# Patient Record
Sex: MALE | Race: WHITE
[De-identification: names, ages, dates, MRNs, and addresses within clinical notes are randomized per-mention and may not be internally consistent; named-entity substitution may affect disease eponyms.]

---

## 2017-01-31 ENCOUNTER — PRIOR ORIGINAL RECORDS (OUTPATIENT)
Dept: OTHER | Age: 52
End: 2017-01-31

## 2017-03-28 ENCOUNTER — PRIOR ORIGINAL RECORDS (OUTPATIENT)
Dept: OTHER | Age: 52
End: 2017-03-28

## 2017-03-31 ENCOUNTER — PRIOR ORIGINAL RECORDS (OUTPATIENT)
Dept: OTHER | Age: 52
End: 2017-03-31

## 2017-04-10 ENCOUNTER — HOSPITAL ENCOUNTER (OUTPATIENT)
Dept: LAB | Facility: HOSPITAL | Age: 52
Discharge: HOME | End: 2017-04-10
Attending: INTERNAL MEDICINE
Payer: COMMERCIAL

## 2017-04-10 ENCOUNTER — HOSPITAL ENCOUNTER (OUTPATIENT)
Dept: CV DIAGNOSTICS | Facility: HOSPITAL | Age: 52
Discharge: HOME | End: 2017-04-10
Attending: INTERNAL MEDICINE
Payer: COMMERCIAL

## 2017-04-10 ENCOUNTER — PRIOR ORIGINAL RECORDS (OUTPATIENT)
Dept: OTHER | Age: 52
End: 2017-04-10

## 2017-04-10 DIAGNOSIS — R94.31 ABNORMAL ECG: ICD-10-CM

## 2017-04-10 DIAGNOSIS — R00.2 HEART PALPITATIONS: ICD-10-CM

## 2017-04-10 DIAGNOSIS — R07.2 CHEST PAIN, PRECORDIAL: ICD-10-CM

## 2017-04-10 PROCEDURE — 93350 STRESS TTE ONLY: CPT | Performed by: INTERNAL MEDICINE

## 2017-04-10 PROCEDURE — 80061 LIPID PANEL: CPT | Performed by: INTERNAL MEDICINE

## 2017-04-10 PROCEDURE — 93350 STRESS TTE ONLY: CPT

## 2017-04-10 PROCEDURE — 80053 COMPREHEN METABOLIC PANEL: CPT | Performed by: INTERNAL MEDICINE

## 2017-04-10 PROCEDURE — 93225 XTRNL ECG REC<48 HRS REC: CPT

## 2017-04-10 PROCEDURE — 36415 COLL VENOUS BLD VENIPUNCTURE: CPT | Performed by: INTERNAL MEDICINE

## 2017-04-10 PROCEDURE — 93018 CV STRESS TEST I&R ONLY: CPT | Performed by: INTERNAL MEDICINE

## 2017-04-10 PROCEDURE — 93227 XTRNL ECG REC<48 HR R&I: CPT | Performed by: INTERNAL MEDICINE

## 2017-04-10 PROCEDURE — 93226 XTRNL ECG REC<48 HR SCAN A/R: CPT

## 2017-04-10 PROCEDURE — 93017 CV STRESS TEST TRACING ONLY: CPT

## 2017-04-11 ENCOUNTER — PRIOR ORIGINAL RECORDS (OUTPATIENT)
Dept: OTHER | Age: 52
End: 2017-04-11

## 2017-04-12 ENCOUNTER — PRIOR ORIGINAL RECORDS (OUTPATIENT)
Dept: OTHER | Age: 52
End: 2017-04-12

## 2017-04-13 LAB
ALBUMIN: 3.6 G/DL
ALKALINE PHOSPHATATE(ALK PHOS): 71 IU/L
BILIRUBIN TOTAL: 0.7 MG/DL
BUN: 18 MG/DL
CALCIUM: 8.5 MG/DL
CHLORIDE: 106 MEQ/L
CHOLESTEROL, TOTAL: 201 MG/DL
CREATININE, SERUM: 1.14 MG/DL
GLUCOSE: 92 MG/DL
HDL CHOLESTEROL: 52 MG/DL
LDL CHOLESTEROL: 120 MG/DL
POTASSIUM, SERUM: 4.4 MEQ/L
PROTEIN, TOTAL: 6.8 G/DL
SGOT (AST): 17 IU/L
SGPT (ALT): 35 IU/L
SODIUM: 140 MEQ/L
TRIGLYCERIDES: 143 MG/DL

## 2017-04-18 ENCOUNTER — PRIOR ORIGINAL RECORDS (OUTPATIENT)
Dept: OTHER | Age: 52
End: 2017-04-18

## 2017-04-26 ENCOUNTER — HOSPITAL ENCOUNTER (OUTPATIENT)
Dept: CV DIAGNOSTICS | Facility: HOSPITAL | Age: 52
Discharge: HOME | End: 2017-04-26
Attending: INTERNAL MEDICINE

## 2017-04-26 ENCOUNTER — MYAURORA ACCOUNT LINK (OUTPATIENT)
Dept: OTHER | Age: 52
End: 2017-04-26

## 2017-04-26 DIAGNOSIS — R07.2 CHEST PAIN, PRECORDIAL: ICD-10-CM

## 2017-04-26 DIAGNOSIS — R00.2 PALPITATIONS: ICD-10-CM

## 2017-04-27 ENCOUNTER — PRIOR ORIGINAL RECORDS (OUTPATIENT)
Dept: OTHER | Age: 52
End: 2017-04-27

## 2017-05-08 ENCOUNTER — MYAURORA ACCOUNT LINK (OUTPATIENT)
Dept: OTHER | Age: 52
End: 2017-05-08

## 2017-05-08 ENCOUNTER — PRIOR ORIGINAL RECORDS (OUTPATIENT)
Dept: OTHER | Age: 52
End: 2017-05-08

## 2019-03-01 VITALS
WEIGHT: 237 LBS | SYSTOLIC BLOOD PRESSURE: 112 MMHG | BODY MASS INDEX: 30.42 KG/M2 | DIASTOLIC BLOOD PRESSURE: 64 MMHG | HEIGHT: 74 IN | HEART RATE: 60 BPM

## 2019-03-01 VITALS
DIASTOLIC BLOOD PRESSURE: 60 MMHG | HEIGHT: 74 IN | WEIGHT: 234 LBS | SYSTOLIC BLOOD PRESSURE: 108 MMHG | HEART RATE: 56 BPM | BODY MASS INDEX: 30.03 KG/M2

## 2021-05-28 ENCOUNTER — WALK IN (OUTPATIENT)
Dept: URGENT CARE | Age: 56
End: 2021-05-28

## 2021-05-28 VITALS
DIASTOLIC BLOOD PRESSURE: 74 MMHG | TEMPERATURE: 97.2 F | HEART RATE: 64 BPM | RESPIRATION RATE: 18 BRPM | OXYGEN SATURATION: 96 % | SYSTOLIC BLOOD PRESSURE: 133 MMHG

## 2021-05-28 DIAGNOSIS — M54.9 BACK PAIN, UNSPECIFIED BACK LOCATION, UNSPECIFIED BACK PAIN LATERALITY, UNSPECIFIED CHRONICITY: Primary | ICD-10-CM

## 2021-05-28 PROCEDURE — 99203 OFFICE O/P NEW LOW 30 MIN: CPT | Performed by: FAMILY MEDICINE

## 2021-05-28 PROCEDURE — 96372 THER/PROPH/DIAG INJ SC/IM: CPT

## 2021-05-28 RX ORDER — KETOROLAC TROMETHAMINE 10 MG/1
10 TABLET, FILM COATED ORAL EVERY 6 HOURS PRN
Qty: 20 TABLET | Refills: 0 | Status: SHIPPED | OUTPATIENT
Start: 2021-05-28 | End: 2021-06-02

## 2021-05-28 RX ORDER — KETOROLAC TROMETHAMINE 30 MG/ML
60 INJECTION, SOLUTION INTRAMUSCULAR; INTRAVENOUS ONCE
Status: COMPLETED | OUTPATIENT
Start: 2021-05-28 | End: 2021-05-28

## 2021-05-28 RX ORDER — FLUOXETINE HYDROCHLORIDE 40 MG/1
40 CAPSULE ORAL DAILY
COMMUNITY
Start: 2021-03-24

## 2021-05-28 RX ORDER — METAXALONE 800 MG/1
800 TABLET ORAL 3 TIMES DAILY PRN
Qty: 15 TABLET | Refills: 0 | Status: SHIPPED | OUTPATIENT
Start: 2021-05-28 | End: 2021-06-04

## 2021-05-28 RX ADMIN — KETOROLAC TROMETHAMINE 60 MG: 30 INJECTION, SOLUTION INTRAMUSCULAR; INTRAVENOUS at 13:19

## 2021-05-28 ASSESSMENT — PAIN SCALES - GENERAL
PAINLEVEL: 4
PAINLEVEL: 6

## 2021-07-07 ENCOUNTER — WALK IN (OUTPATIENT)
Dept: URGENT CARE | Age: 56
End: 2021-07-07

## 2021-07-07 VITALS
BODY MASS INDEX: 28.63 KG/M2 | RESPIRATION RATE: 16 BRPM | WEIGHT: 220 LBS | HEART RATE: 54 BPM | SYSTOLIC BLOOD PRESSURE: 126 MMHG | DIASTOLIC BLOOD PRESSURE: 75 MMHG

## 2021-07-07 DIAGNOSIS — M54.40 ACUTE LOW BACK PAIN WITH SCIATICA, SCIATICA LATERALITY UNSPECIFIED, UNSPECIFIED BACK PAIN LATERALITY: Primary | ICD-10-CM

## 2021-07-07 DIAGNOSIS — S39.012A LUMBOSACRAL STRAIN, INITIAL ENCOUNTER: ICD-10-CM

## 2021-07-07 PROCEDURE — 96372 THER/PROPH/DIAG INJ SC/IM: CPT | Performed by: FAMILY MEDICINE

## 2021-07-07 PROCEDURE — 99214 OFFICE O/P EST MOD 30 MIN: CPT | Performed by: FAMILY MEDICINE

## 2021-07-07 RX ORDER — CARISOPRODOL 350 MG/1
350 TABLET ORAL 4 TIMES DAILY PRN
Qty: 20 TABLET | Refills: 0 | Status: SHIPPED | OUTPATIENT
Start: 2021-07-07 | End: 2021-07-12

## 2021-07-07 RX ORDER — KETOROLAC TROMETHAMINE 30 MG/ML
60 INJECTION, SOLUTION INTRAMUSCULAR; INTRAVENOUS ONCE
Status: COMPLETED | OUTPATIENT
Start: 2021-07-07 | End: 2021-07-07

## 2021-07-07 RX ADMIN — KETOROLAC TROMETHAMINE 60 MG: 30 INJECTION, SOLUTION INTRAMUSCULAR; INTRAVENOUS at 15:03

## 2021-07-07 ASSESSMENT — PAIN SCALES - GENERAL
PAINLEVEL_OUTOF10: 5
PAINLEVEL: 5

## 2021-12-03 ENCOUNTER — LAB ENCOUNTER (OUTPATIENT)
Dept: LAB | Facility: HOSPITAL | Age: 56
End: 2021-12-03
Attending: INTERNAL MEDICINE
Payer: COMMERCIAL

## 2021-12-03 DIAGNOSIS — Z01.818 PRE-OP TESTING: ICD-10-CM

## 2021-12-06 PROBLEM — D12.4 BENIGN NEOPLASM OF DESCENDING COLON: Status: ACTIVE | Noted: 2021-12-06

## 2021-12-06 PROBLEM — Z86.010 HISTORY OF ADENOMATOUS POLYP OF COLON: Status: ACTIVE | Noted: 2021-12-06

## 2021-12-06 PROBLEM — Z86.0101 HISTORY OF ADENOMATOUS POLYP OF COLON: Status: ACTIVE | Noted: 2021-12-06

## 2023-06-30 ENCOUNTER — OFFICE VISIT (OUTPATIENT)
Dept: INTERNAL MEDICINE CLINIC | Facility: CLINIC | Age: 58
End: 2023-06-30
Payer: COMMERCIAL

## 2023-06-30 VITALS
BODY MASS INDEX: 28.1 KG/M2 | HEIGHT: 73 IN | TEMPERATURE: 99 F | HEART RATE: 80 BPM | RESPIRATION RATE: 14 BRPM | OXYGEN SATURATION: 99 % | DIASTOLIC BLOOD PRESSURE: 70 MMHG | SYSTOLIC BLOOD PRESSURE: 122 MMHG | WEIGHT: 212 LBS

## 2023-06-30 DIAGNOSIS — H65.91 FLUID LEVEL BEHIND TYMPANIC MEMBRANE OF RIGHT EAR: ICD-10-CM

## 2023-06-30 DIAGNOSIS — R42 DIZZINESS: ICD-10-CM

## 2023-06-30 DIAGNOSIS — R20.0 NUMBNESS AND TINGLING IN BOTH HANDS: ICD-10-CM

## 2023-06-30 DIAGNOSIS — Z76.89 ESTABLISHING CARE WITH NEW DOCTOR, ENCOUNTER FOR: Primary | ICD-10-CM

## 2023-06-30 DIAGNOSIS — Z00.00 LABORATORY EXAM ORDERED AS PART OF ROUTINE GENERAL MEDICAL EXAMINATION: ICD-10-CM

## 2023-06-30 DIAGNOSIS — R20.2 NUMBNESS AND TINGLING IN BOTH HANDS: ICD-10-CM

## 2023-06-30 PROBLEM — G89.29 CHRONIC LOW BACK PAIN: Status: ACTIVE | Noted: 2019-09-09

## 2023-06-30 PROBLEM — M54.50 CHRONIC LOW BACK PAIN: Status: ACTIVE | Noted: 2019-09-09

## 2023-06-30 PROBLEM — I47.19 ECTOPIC ATRIAL TACHYCARDIA (HCC): Status: ACTIVE | Noted: 2023-06-30

## 2023-06-30 PROBLEM — I65.29 CAROTID ATHEROSCLEROSIS: Status: ACTIVE | Noted: 2022-01-15

## 2023-06-30 PROBLEM — R00.1 SINUS BRADYCARDIA: Status: ACTIVE | Noted: 2023-06-30

## 2023-06-30 PROBLEM — E78.00 HYPERCHOLESTEROLEMIA: Status: ACTIVE | Noted: 2022-01-16

## 2023-06-30 PROBLEM — I47.19 ECTOPIC ATRIAL TACHYCARDIA: Status: ACTIVE | Noted: 2023-06-30

## 2023-06-30 PROBLEM — E88.2 LIPOMATOSIS: Status: ACTIVE | Noted: 2023-06-30

## 2023-06-30 PROBLEM — I45.89 CHRONOTROPIC INCOMPETENCE: Status: ACTIVE | Noted: 2023-06-30

## 2023-06-30 PROBLEM — R55 VASOVAGAL SYNCOPE: Status: ACTIVE | Noted: 2023-06-30

## 2023-06-30 PROBLEM — F32.9 MAJOR DEPRESSIVE DISORDER: Status: ACTIVE | Noted: 2023-06-30

## 2023-06-30 PROBLEM — I47.1 ECTOPIC ATRIAL TACHYCARDIA (HCC): Status: ACTIVE | Noted: 2023-06-30

## 2023-06-30 PROBLEM — F43.21 ADJUSTMENT DISORDER WITH DEPRESSED MOOD: Status: ACTIVE | Noted: 2023-06-30

## 2023-06-30 PROCEDURE — 3074F SYST BP LT 130 MM HG: CPT

## 2023-06-30 PROCEDURE — 3008F BODY MASS INDEX DOCD: CPT

## 2023-06-30 PROCEDURE — 99205 OFFICE O/P NEW HI 60 MIN: CPT

## 2023-06-30 PROCEDURE — 3078F DIAST BP <80 MM HG: CPT

## 2023-06-30 RX ORDER — PREDNISONE 20 MG/1
40 TABLET ORAL DAILY
Qty: 14 TABLET | Refills: 0 | Status: SHIPPED | OUTPATIENT
Start: 2023-06-30 | End: 2023-07-07

## 2023-06-30 RX ORDER — FLUDROCORTISONE ACETATE 0.1 MG/1
0.1 TABLET ORAL DAILY
COMMUNITY

## 2023-07-01 ENCOUNTER — LAB ENCOUNTER (OUTPATIENT)
Dept: LAB | Age: 58
End: 2023-07-01
Payer: COMMERCIAL

## 2023-07-01 DIAGNOSIS — R20.0 NUMBNESS AND TINGLING IN BOTH HANDS: ICD-10-CM

## 2023-07-01 DIAGNOSIS — R20.2 NUMBNESS AND TINGLING IN BOTH HANDS: ICD-10-CM

## 2023-07-01 DIAGNOSIS — Z00.00 LABORATORY EXAM ORDERED AS PART OF ROUTINE GENERAL MEDICAL EXAMINATION: ICD-10-CM

## 2023-07-01 LAB
ALBUMIN SERPL-MCNC: 3.6 G/DL (ref 3.4–5)
ALBUMIN/GLOB SERPL: 1.1 {RATIO} (ref 1–2)
ALP LIVER SERPL-CCNC: 60 U/L
ALT SERPL-CCNC: 24 U/L
ANION GAP SERPL CALC-SCNC: 0 MMOL/L (ref 0–18)
AST SERPL-CCNC: 19 U/L (ref 15–37)
BASOPHILS # BLD AUTO: 0.02 X10(3) UL (ref 0–0.2)
BASOPHILS NFR BLD AUTO: 0.2 %
BILIRUB SERPL-MCNC: 0.6 MG/DL (ref 0.1–2)
BILIRUB UR QL STRIP.AUTO: NEGATIVE
BUN BLD-MCNC: 22 MG/DL (ref 7–18)
CALCIUM BLD-MCNC: 9.2 MG/DL (ref 8.5–10.1)
CHLORIDE SERPL-SCNC: 109 MMOL/L (ref 98–112)
CHOLEST SERPL-MCNC: 217 MG/DL (ref ?–200)
CO2 SERPL-SCNC: 27 MMOL/L (ref 21–32)
COMPLEXED PSA SERPL-MCNC: 0.87 NG/ML (ref ?–4)
CREAT BLD-MCNC: 1.27 MG/DL
EOSINOPHIL # BLD AUTO: 0.08 X10(3) UL (ref 0–0.7)
EOSINOPHIL NFR BLD AUTO: 0.7 %
ERYTHROCYTE [DISTWIDTH] IN BLOOD BY AUTOMATED COUNT: 11.8 %
FASTING PATIENT LIPID ANSWER: YES
FASTING STATUS PATIENT QL REPORTED: YES
FOLATE SERPL-MCNC: 8.6 NG/ML (ref 8.7–?)
GFR SERPLBLD BASED ON 1.73 SQ M-ARVRAT: 65 ML/MIN/1.73M2 (ref 60–?)
GLOBULIN PLAS-MCNC: 3.2 G/DL (ref 2.8–4.4)
GLUCOSE BLD-MCNC: 103 MG/DL (ref 70–99)
GLUCOSE UR STRIP.AUTO-MCNC: NEGATIVE MG/DL
HCT VFR BLD AUTO: 45 %
HDLC SERPL-MCNC: 69 MG/DL (ref 40–59)
HGB BLD-MCNC: 15.4 G/DL
IMM GRANULOCYTES # BLD AUTO: 0.03 X10(3) UL (ref 0–1)
IMM GRANULOCYTES NFR BLD: 0.3 %
LDLC SERPL CALC-MCNC: 139 MG/DL (ref ?–100)
LEUKOCYTE ESTERASE UR QL STRIP.AUTO: NEGATIVE
LYMPHOCYTES # BLD AUTO: 1.98 X10(3) UL (ref 1–4)
LYMPHOCYTES NFR BLD AUTO: 18 %
MCH RBC QN AUTO: 32.5 PG (ref 26–34)
MCHC RBC AUTO-ENTMCNC: 34.2 G/DL (ref 31–37)
MCV RBC AUTO: 94.9 FL
MONOCYTES # BLD AUTO: 0.62 X10(3) UL (ref 0.1–1)
MONOCYTES NFR BLD AUTO: 5.7 %
NEUTROPHILS # BLD AUTO: 8.24 X10 (3) UL (ref 1.5–7.7)
NEUTROPHILS # BLD AUTO: 8.24 X10(3) UL (ref 1.5–7.7)
NEUTROPHILS NFR BLD AUTO: 75.1 %
NITRITE UR QL STRIP.AUTO: NEGATIVE
NONHDLC SERPL-MCNC: 148 MG/DL (ref ?–130)
OSMOLALITY SERPL CALC.SUM OF ELEC: 286 MOSM/KG (ref 275–295)
PH UR STRIP.AUTO: 5 [PH] (ref 5–8)
PLATELET # BLD AUTO: 252 10(3)UL (ref 150–450)
POTASSIUM SERPL-SCNC: 4.4 MMOL/L (ref 3.5–5.1)
PROT SERPL-MCNC: 6.8 G/DL (ref 6.4–8.2)
PROT UR STRIP.AUTO-MCNC: NEGATIVE MG/DL
RBC # BLD AUTO: 4.74 X10(6)UL
RBC UR QL AUTO: NEGATIVE
SODIUM SERPL-SCNC: 136 MMOL/L (ref 136–145)
SP GR UR STRIP.AUTO: 1.03 (ref 1–1.03)
TRIGL SERPL-MCNC: 49 MG/DL (ref 30–149)
TSI SER-ACNC: 1.36 MIU/ML (ref 0.36–3.74)
UROBILINOGEN UR STRIP.AUTO-MCNC: 2 MG/DL
VIT B12 SERPL-MCNC: 317 PG/ML (ref 193–986)
VIT D+METAB SERPL-MCNC: 47.1 NG/ML (ref 30–100)
VLDLC SERPL CALC-MCNC: 9 MG/DL (ref 0–30)
WBC # BLD AUTO: 11 X10(3) UL (ref 4–11)

## 2023-07-01 PROCEDURE — 82306 VITAMIN D 25 HYDROXY: CPT

## 2023-07-01 PROCEDURE — 80061 LIPID PANEL: CPT

## 2023-07-01 PROCEDURE — 80050 GENERAL HEALTH PANEL: CPT

## 2023-07-01 PROCEDURE — 84153 ASSAY OF PSA TOTAL: CPT

## 2023-07-01 PROCEDURE — 81001 URINALYSIS AUTO W/SCOPE: CPT

## 2023-07-01 PROCEDURE — 82746 ASSAY OF FOLIC ACID SERUM: CPT

## 2023-07-01 PROCEDURE — 82607 VITAMIN B-12: CPT

## 2023-07-02 PROBLEM — Z91.89 RISK FOR CORONARY ARTERY DISEASE LESS THAN 10% IN NEXT 10 YEARS PER FRAMINGHAM SCORE: Status: ACTIVE | Noted: 2023-07-02

## 2023-07-05 DIAGNOSIS — E53.8 FOLIC ACID DEFICIENCY: ICD-10-CM

## 2023-07-05 DIAGNOSIS — D72.9 NEUTROPHILIA: Primary | ICD-10-CM

## 2023-07-05 RX ORDER — FOLIC ACID 1 MG/1
1 TABLET ORAL DAILY
Refills: 0 | COMMUNITY
Start: 2023-07-05

## 2023-07-13 ENCOUNTER — MED REC SCAN ONLY (OUTPATIENT)
Dept: INTERNAL MEDICINE CLINIC | Facility: CLINIC | Age: 58
End: 2023-07-13

## 2023-07-14 ENCOUNTER — TELEPHONE (OUTPATIENT)
Dept: INTERNAL MEDICINE CLINIC | Facility: CLINIC | Age: 58
End: 2023-07-14

## 2023-08-25 ENCOUNTER — OFFICE VISIT (OUTPATIENT)
Dept: INTERNAL MEDICINE CLINIC | Facility: CLINIC | Age: 58
End: 2023-08-25
Payer: COMMERCIAL

## 2023-08-25 VITALS
TEMPERATURE: 98 F | SYSTOLIC BLOOD PRESSURE: 122 MMHG | HEART RATE: 58 BPM | BODY MASS INDEX: 28.89 KG/M2 | WEIGHT: 218 LBS | RESPIRATION RATE: 16 BRPM | OXYGEN SATURATION: 98 % | HEIGHT: 73 IN | DIASTOLIC BLOOD PRESSURE: 80 MMHG

## 2023-08-25 DIAGNOSIS — Z00.00 ANNUAL PHYSICAL EXAM: Primary | ICD-10-CM

## 2023-08-25 PROBLEM — D12.4 BENIGN NEOPLASM OF DESCENDING COLON: Status: RESOLVED | Noted: 2021-12-06 | Resolved: 2023-08-25

## 2023-08-25 PROBLEM — I47.19 ECTOPIC ATRIAL TACHYCARDIA: Status: RESOLVED | Noted: 2023-06-30 | Resolved: 2023-08-25

## 2023-08-25 PROBLEM — E78.00 HYPERCHOLESTEROLEMIA: Status: RESOLVED | Noted: 2022-01-16 | Resolved: 2023-08-25

## 2023-08-25 PROBLEM — E88.2 LIPOMATOSIS: Status: RESOLVED | Noted: 2023-06-30 | Resolved: 2023-08-25

## 2023-08-25 PROBLEM — Z91.89 10 YEAR RISK OF MI OR STROKE < 7.5%: Status: ACTIVE | Noted: 2023-08-25

## 2023-08-25 PROBLEM — F33.0 DEPRESSION, MAJOR, RECURRENT, MILD (HCC): Status: ACTIVE | Noted: 2023-08-25

## 2023-08-25 PROBLEM — F32.9 MAJOR DEPRESSIVE DISORDER: Status: RESOLVED | Noted: 2023-06-30 | Resolved: 2023-08-25

## 2023-08-25 PROBLEM — I47.19 ECTOPIC ATRIAL TACHYCARDIA (HCC): Status: RESOLVED | Noted: 2023-06-30 | Resolved: 2023-08-25

## 2023-08-25 PROBLEM — F43.21 ADJUSTMENT DISORDER WITH DEPRESSED MOOD: Status: RESOLVED | Noted: 2023-06-30 | Resolved: 2023-08-25

## 2023-08-25 PROBLEM — I45.89 CHRONOTROPIC INCOMPETENCE: Status: RESOLVED | Noted: 2023-06-30 | Resolved: 2023-08-25

## 2023-08-25 PROBLEM — F33.0 DEPRESSION, MAJOR, RECURRENT, MILD: Status: ACTIVE | Noted: 2023-08-25

## 2023-08-25 PROBLEM — I47.1 ECTOPIC ATRIAL TACHYCARDIA (HCC): Status: RESOLVED | Noted: 2023-06-30 | Resolved: 2023-08-25

## 2023-08-25 PROBLEM — Z91.89 RISK FOR CORONARY ARTERY DISEASE LESS THAN 10% IN NEXT 10 YEARS PER FRAMINGHAM SCORE: Status: RESOLVED | Noted: 2023-07-02 | Resolved: 2023-08-25

## 2023-08-25 PROCEDURE — 99396 PREV VISIT EST AGE 40-64: CPT | Performed by: INTERNAL MEDICINE

## 2023-08-25 PROCEDURE — 3008F BODY MASS INDEX DOCD: CPT | Performed by: INTERNAL MEDICINE

## 2023-08-25 PROCEDURE — 3074F SYST BP LT 130 MM HG: CPT | Performed by: INTERNAL MEDICINE

## 2023-08-25 PROCEDURE — 3079F DIAST BP 80-89 MM HG: CPT | Performed by: INTERNAL MEDICINE

## 2023-11-24 ENCOUNTER — TELEPHONE (OUTPATIENT)
Dept: INTERNAL MEDICINE CLINIC | Facility: CLINIC | Age: 58
End: 2023-11-24

## 2023-11-24 DIAGNOSIS — U07.1 COVID-19: Primary | ICD-10-CM

## 2023-11-24 NOTE — TELEPHONE ENCOUNTER
Pt c/o COVID symptoms  x 2 day, tested positive yesterday  Tickle in throat, mild cough, congestion and headache. Denies sob, fever  Dr Miriam Hernández suggested paxlovid as pt's wife is coming home from hospital, pt states she will be medicated as well. Paxlovid sent to pharmacy. Discussed medication indications, risks, alternatives and side effects. Pt expresses understanding and agrees to the plan.

## 2023-12-04 ENCOUNTER — TELEPHONE (OUTPATIENT)
Dept: INTERNAL MEDICINE CLINIC | Facility: CLINIC | Age: 58
End: 2023-12-04

## 2023-12-04 DIAGNOSIS — R09.81 SINUS CONGESTION: ICD-10-CM

## 2023-12-04 DIAGNOSIS — R09.89 CHEST CONGESTION: Primary | ICD-10-CM

## 2023-12-04 RX ORDER — ALBUTEROL SULFATE 90 UG/1
2 AEROSOL, METERED RESPIRATORY (INHALATION) EVERY 6 HOURS PRN
Qty: 8 G | Refills: 0 | Status: SHIPPED | OUTPATIENT
Start: 2023-12-04

## 2023-12-04 RX ORDER — FLUTICASONE PROPIONATE 50 MCG
1 SPRAY, SUSPENSION (ML) NASAL DAILY
Qty: 9.9 ML | Refills: 0 | Status: SHIPPED | OUTPATIENT
Start: 2023-12-04

## 2023-12-04 NOTE — TELEPHONE ENCOUNTER
Patient prescribed Paxlovid on 11/24, has since completed course. Denies fevers. No coughing. Patient has chest and head congestion. Has taken Advil for headaches due to sinus pressure and that has helped. Experienced some mouth numbness this morning which he states felt like he received a novocain injection in his mouth. This lasted roughly half an hour and has since resolved. Message sent to [place]. Await recommendations from provider. Per LULA Bhat: Rebound symptoms. Supportive care, otc meds prn and rest. Can send albuterol and tessalon perles if needed     Discussed continued use of Advil as needed for headache, add on Flonase and 24 hour antihistamine. May also use OTC decongestant such as Mucinex. Patient verbalized understanding. Rx sent to pharmacy.

## 2023-12-04 NOTE — TELEPHONE ENCOUNTER
PT HAD COVID, OUR OFFICE GAVE HIM PAXLOVID. PT FINISHED IT. FELT GOOD & TESTED NEGATIVE. PT NOW FEELS SICK AGAIN, MOUTH FEELS NUMB, HEAD COLD SYMPTOMS AGAIN. RETESTED FOR COVID & ITS POSITIVE AGAIN.   PLEASE CALL TO ADVISE

## 2023-12-26 DIAGNOSIS — R09.89 CHEST CONGESTION: ICD-10-CM

## 2023-12-26 RX ORDER — ALBUTEROL SULFATE 90 UG/1
2 AEROSOL, METERED RESPIRATORY (INHALATION) EVERY 6 HOURS PRN
Qty: 8.5 EACH | Refills: 0 | Status: SHIPPED | OUTPATIENT
Start: 2023-12-26

## 2023-12-26 NOTE — TELEPHONE ENCOUNTER
Last time medication was refilled 12/04/2023  Quantity and # of refills 8 g w 0  Last OV 08/25/2023  Next OV No future appointments.     Protocol failed     Sent to [employer] for approval.

## 2024-01-25 DIAGNOSIS — R09.81 SINUS CONGESTION: ICD-10-CM

## 2024-01-25 RX ORDER — FLUTICASONE PROPIONATE 50 MCG
1 SPRAY, SUSPENSION (ML) NASAL DAILY
Qty: 32 G | Refills: 0 | Status: SHIPPED | OUTPATIENT
Start: 2024-01-25

## 2024-01-25 NOTE — TELEPHONE ENCOUNTER
Last time medication was refilled 12/04/2023  Quantity and # of refills 9.9 ML W 0  Last OV 08/25/2023  Next OV No future appointments.    Passed protocol, Rx sent.

## 2024-01-25 NOTE — TELEPHONE ENCOUNTER
Medication requested: fluticasone propionate (FLONASE ALLERGY RELIEF) 50 MCG/ACT Nasal Suspension       Is patient requesting 30 or 90 day supply:  90    Pharmacy name/location:  [facility]ACY #[id number]  [place] [address] AT [facility], [phone], [phone] [[id number]]     LOV:  08/25/2023    Is the patient due for appointment: no (if so, please schedule)    Additional notes:  no

## 2024-01-25 NOTE — TELEPHONE ENCOUNTER
Last time medication was refilled 12/4/23  Quantity and # of refills 16 g/0  Last OV 8/25/823  Next OV none scheduled

## 2024-03-07 DIAGNOSIS — R42 ORTHOSTATIC DIZZINESS: Primary | ICD-10-CM

## 2024-03-07 DIAGNOSIS — F33.0 DEPRESSION, MAJOR, RECURRENT, MILD (HCC): ICD-10-CM

## 2024-03-07 RX ORDER — FLUOXETINE HYDROCHLORIDE 40 MG/1
40 CAPSULE ORAL DAILY
Qty: 90 CAPSULE | Refills: 1 | Status: SHIPPED | OUTPATIENT
Start: 2024-03-07

## 2024-03-07 RX ORDER — FLUDROCORTISONE ACETATE 0.1 MG/1
0.1 TABLET ORAL DAILY
Qty: 90 TABLET | Refills: 1 | Status: SHIPPED | OUTPATIENT
Start: 2024-03-07

## 2024-03-07 NOTE — TELEPHONE ENCOUNTER
LVM [facility].    Need to know why patient is using fludrocortisone and if he is wanting to restart Prozac or if he is currently taking as I do not see a recent dispense hx. Patient newly established at office last fall.

## 2024-03-07 NOTE — TELEPHONE ENCOUNTER
Spoke to patient. Patient currently taking Prozac. Takes Fludrocortisone for orthostatic dizziness he has experienced in past which had caused him to pass out. He has not had an episode since starting this medication and requesting refill.    Meds sent to Dr. Frias for approval for refill.

## 2024-03-07 NOTE — TELEPHONE ENCOUNTER
FLUoxetine HCl (PROZAC) 40 MG Oral Cap     fludrocortisone 0.1 MG Oral Tab       patient requesting 90 day supply    Pharmacy name/location:  [facility]/PHARMACY #[id number] - [place], [place] [address] AT [facility], [phone], [phone] [[id number]]     LOV:  8/25/23    Is the patient due for appointment: Physical due for Aug 2024    Additional notes:  Previously recvd from old PCP. Looking for us to take over ordering this Rx.

## 2024-04-10 DIAGNOSIS — R09.81 SINUS CONGESTION: ICD-10-CM

## 2024-04-10 RX ORDER — FLUTICASONE PROPIONATE 50 MCG
1 SPRAY, SUSPENSION (ML) NASAL DAILY
Qty: 16 ML | Refills: 0 | Status: SHIPPED | OUTPATIENT
Start: 2024-04-10

## 2024-04-10 NOTE — TELEPHONE ENCOUNTER
Last time medication was refilled 01/25/2024  Last OV 08/25/2023  Next OV due/scheduled No future appointments.      Passed protocol, Rx sent.

## 2024-09-02 DIAGNOSIS — F33.0 DEPRESSION, MAJOR, RECURRENT, MILD (HCC): ICD-10-CM

## 2024-09-02 RX ORDER — FLUOXETINE 40 MG/1
40 CAPSULE ORAL DAILY
Qty: 90 CAPSULE | Refills: 1 | Status: SHIPPED | OUTPATIENT
Start: 2024-09-02

## 2024-09-02 NOTE — TELEPHONE ENCOUNTER
Last time medication was refilled: 3/7/24  Next office visit due/scheduled: no apt  Last office visit: 8/25/23  Last Labs: 7/1/23

## 2024-09-13 SDOH — HEALTH STABILITY: PHYSICAL HEALTH: ON AVERAGE, HOW MANY DAYS PER WEEK DO YOU ENGAGE IN MODERATE TO STRENUOUS EXERCISE (LIKE A BRISK WALK)?: 0 DAYS

## 2024-09-16 ENCOUNTER — OFFICE VISIT (OUTPATIENT)
Age: 59
End: 2024-09-16
Payer: COMMERCIAL

## 2024-09-16 VITALS
TEMPERATURE: 98.1 F | WEIGHT: 238.8 LBS | OXYGEN SATURATION: 99 % | HEIGHT: 74 IN | SYSTOLIC BLOOD PRESSURE: 133 MMHG | BODY MASS INDEX: 30.65 KG/M2 | DIASTOLIC BLOOD PRESSURE: 74 MMHG | HEART RATE: 53 BPM

## 2024-09-16 DIAGNOSIS — I95.1 ORTHOSTATIC HYPOTENSION: ICD-10-CM

## 2024-09-16 DIAGNOSIS — Z00.00 ENCOUNTER FOR WELLNESS EXAMINATION IN ADULT: Primary | ICD-10-CM

## 2024-09-16 PROCEDURE — 99386 PREV VISIT NEW AGE 40-64: CPT | Performed by: NURSE PRACTITIONER

## 2024-09-16 RX ORDER — FLUDROCORTISONE ACETATE 0.1 MG/1
0.1 TABLET ORAL DAILY
COMMUNITY
Start: 2024-03-07 | End: 2024-09-16 | Stop reason: SDUPTHER

## 2024-09-16 RX ORDER — FLUDROCORTISONE ACETATE 0.1 MG/1
0.1 TABLET ORAL DAILY
Qty: 90 TABLET | Refills: 1 | Status: SHIPPED | OUTPATIENT
Start: 2024-09-16

## 2024-09-16 RX ORDER — FLUOXETINE 40 MG/1
1 CAPSULE ORAL DAILY
COMMUNITY
Start: 2021-03-24

## 2024-09-17 LAB
ALBUMIN SERPL-MCNC: 3.5 G/DL (ref 3.5–5)
ALBUMIN/GLOB SERPL: 1.1 (ref 1.1–2.2)
ALP SERPL-CCNC: 64 U/L (ref 45–117)
ALT SERPL-CCNC: 26 U/L (ref 12–78)
ANION GAP SERPL CALC-SCNC: 5 MMOL/L (ref 2–12)
AST SERPL-CCNC: 19 U/L (ref 15–37)
BASOPHILS # BLD: 0 K/UL (ref 0–0.1)
BASOPHILS NFR BLD: 0 % (ref 0–1)
BILIRUB SERPL-MCNC: 0.4 MG/DL (ref 0.2–1)
BUN SERPL-MCNC: 21 MG/DL (ref 6–20)
BUN/CREAT SERPL: 18 (ref 12–20)
CALCIUM SERPL-MCNC: 8.8 MG/DL (ref 8.5–10.1)
CHLORIDE SERPL-SCNC: 108 MMOL/L (ref 97–108)
CHOLEST SERPL-MCNC: 186 MG/DL
CO2 SERPL-SCNC: 25 MMOL/L (ref 21–32)
CREAT SERPL-MCNC: 1.14 MG/DL (ref 0.7–1.3)
DIFFERENTIAL METHOD BLD: NORMAL
EOSINOPHIL # BLD: 0.2 K/UL (ref 0–0.4)
EOSINOPHIL NFR BLD: 3 % (ref 0–7)
ERYTHROCYTE [DISTWIDTH] IN BLOOD BY AUTOMATED COUNT: 11.9 % (ref 11.5–14.5)
EST. AVERAGE GLUCOSE BLD GHB EST-MCNC: 97 MG/DL
GLOBULIN SER CALC-MCNC: 3.1 G/DL (ref 2–4)
GLUCOSE SERPL-MCNC: 92 MG/DL (ref 65–100)
HBA1C MFR BLD: 5 % (ref 4–5.6)
HCT VFR BLD AUTO: 40.1 % (ref 36.6–50.3)
HDLC SERPL-MCNC: 55 MG/DL
HDLC SERPL: 3.4 (ref 0–5)
HGB BLD-MCNC: 13.6 G/DL (ref 12.1–17)
IMM GRANULOCYTES # BLD AUTO: 0 K/UL (ref 0–0.04)
IMM GRANULOCYTES NFR BLD AUTO: 0 % (ref 0–0.5)
LDLC SERPL CALC-MCNC: 113.8 MG/DL (ref 0–100)
LYMPHOCYTES # BLD: 1.7 K/UL (ref 0.8–3.5)
LYMPHOCYTES NFR BLD: 26 % (ref 12–49)
MCH RBC QN AUTO: 32.4 PG (ref 26–34)
MCHC RBC AUTO-ENTMCNC: 33.9 G/DL (ref 30–36.5)
MCV RBC AUTO: 95.5 FL (ref 80–99)
MONOCYTES # BLD: 0.5 K/UL (ref 0–1)
MONOCYTES NFR BLD: 8 % (ref 5–13)
NEUTS SEG # BLD: 4 K/UL (ref 1.8–8)
NEUTS SEG NFR BLD: 63 % (ref 32–75)
NRBC # BLD: 0 K/UL (ref 0–0.01)
NRBC BLD-RTO: 0 PER 100 WBC
PLATELET # BLD AUTO: 251 K/UL (ref 150–400)
PMV BLD AUTO: 10.9 FL (ref 8.9–12.9)
POTASSIUM SERPL-SCNC: 4.2 MMOL/L (ref 3.5–5.1)
PROT SERPL-MCNC: 6.6 G/DL (ref 6.4–8.2)
PSA SERPL-MCNC: 1.1 NG/ML (ref 0.01–4)
RBC # BLD AUTO: 4.2 M/UL (ref 4.1–5.7)
SODIUM SERPL-SCNC: 138 MMOL/L (ref 136–145)
T4 FREE SERPL-MCNC: 0.8 NG/DL (ref 0.8–1.5)
TRIGL SERPL-MCNC: 86 MG/DL
TSH SERPL DL<=0.05 MIU/L-ACNC: 1.58 UIU/ML (ref 0.36–3.74)
VLDLC SERPL CALC-MCNC: 17.2 MG/DL
WBC # BLD AUTO: 6.4 K/UL (ref 4.1–11.1)

## 2025-03-05 DIAGNOSIS — F33.0 DEPRESSION, MAJOR, RECURRENT, MILD: ICD-10-CM

## 2025-03-06 RX ORDER — FLUOXETINE HYDROCHLORIDE 40 MG/1
40 CAPSULE ORAL DAILY
Qty: 30 CAPSULE | Refills: 0 | Status: SHIPPED | OUTPATIENT
Start: 2025-03-06

## 2025-03-06 NOTE — TELEPHONE ENCOUNTER
Last time medication was refilled 09/02/2024  Last office visit  08/25/2023  Next office visit due/scheduled No future appointments.    Medication not on protocol.     Patient overdue for annual,  to coordinate.

## 2025-04-12 DIAGNOSIS — F33.0 DEPRESSION, MAJOR, RECURRENT, MILD: ICD-10-CM

## 2025-04-12 RX ORDER — FLUOXETINE HYDROCHLORIDE 40 MG/1
40 CAPSULE ORAL DAILY
Qty: 15 CAPSULE | Refills: 0 | Status: SHIPPED | OUTPATIENT
Start: 2025-04-12

## 2025-04-12 NOTE — TELEPHONE ENCOUNTER
Last time medication was refilled 3/6/25  Last office visit  8/25/23  Next office visit due/scheduled No future appointments.        Last OV 8/25/23, please call to schedule physical.

## 2025-05-12 DIAGNOSIS — F33.0 DEPRESSION, MAJOR, RECURRENT, MILD: ICD-10-CM

## 2025-05-12 RX ORDER — FLUOXETINE HYDROCHLORIDE 40 MG/1
40 CAPSULE ORAL DAILY
Qty: 10 CAPSULE | Refills: 0 | Status: SHIPPED | OUTPATIENT
Start: 2025-05-12

## 2025-05-12 NOTE — TELEPHONE ENCOUNTER
Last time medication was refilled 4/12/25  Last office visit  8/25/23  Next office visit due/scheduled No future appointments.

## 2025-06-11 ENCOUNTER — TELEPHONE (OUTPATIENT)
Facility: CLINIC | Age: 60
End: 2025-06-11

## 2025-06-11 RX ORDER — FLUDROCORTISONE ACETATE 0.1 MG/1
0.1 TABLET ORAL DAILY
Qty: 90 TABLET | Refills: 0 | Status: SHIPPED | OUTPATIENT
Start: 2025-06-11

## 2025-06-11 NOTE — TELEPHONE ENCOUNTER
We received a fax refill request for [facility].  Please escribe fludrocortisone (FLORINEF) 0.1 MG tablet  to their pharmacy.  The pharmacy is correct in the chart and they are requesting a 90 day supply.

## 2025-07-03 DIAGNOSIS — F33.0 DEPRESSION, MAJOR, RECURRENT, MILD: ICD-10-CM

## 2025-07-03 RX ORDER — FLUOXETINE HYDROCHLORIDE 40 MG/1
40 CAPSULE ORAL DAILY
Qty: 10 CAPSULE | Refills: 0 | OUTPATIENT
Start: 2025-07-03

## 2025-07-07 RX ORDER — FLUOXETINE HYDROCHLORIDE 40 MG/1
40 CAPSULE ORAL DAILY
Qty: 90 CAPSULE | Refills: 1 | Status: SHIPPED | OUTPATIENT
Start: 2025-07-07

## 2025-07-09 ENCOUNTER — TELEPHONE (OUTPATIENT)
Facility: CLINIC | Age: 60
End: 2025-07-09

## 2025-07-09 NOTE — TELEPHONE ENCOUNTER
[facility] called into the office today requesting medical records, informed her we did not get the request. Provided the office fax number, verbalized understanding of the fax number and the medical records request process.

## 2025-07-11 ENCOUNTER — CLINICAL DOCUMENTATION (OUTPATIENT)
Facility: CLINIC | Age: 60
End: 2025-07-11